# Patient Record
Sex: MALE | URBAN - METROPOLITAN AREA
[De-identification: names, ages, dates, MRNs, and addresses within clinical notes are randomized per-mention and may not be internally consistent; named-entity substitution may affect disease eponyms.]

---

## 2021-04-08 ENCOUNTER — EMERGENCY (EMERGENCY)
Facility: HOSPITAL | Age: 61
LOS: 1 days | End: 2021-04-08
Attending: EMERGENCY MEDICINE
Payer: COMMERCIAL

## 2021-04-08 DIAGNOSIS — Z95.1 PRESENCE OF AORTOCORONARY BYPASS GRAFT: Chronic | ICD-10-CM

## 2021-04-08 LAB
FLUAV AG NPH QL: SIGNIFICANT CHANGE UP COUNTS
FLUBV AG NPH QL: SIGNIFICANT CHANGE UP COUNTS
RSV RNA NPH QL NAA+NON-PROBE: SIGNIFICANT CHANGE UP COUNTS
SARS-COV-2 RNA SPEC QL NAA+PROBE: SIGNIFICANT CHANGE UP COUNTS

## 2021-04-08 PROCEDURE — 31500 INSERT EMERGENCY AIRWAY: CPT

## 2021-04-08 PROCEDURE — 99285 EMERGENCY DEPT VISIT HI MDM: CPT | Mod: 25

## 2021-04-08 PROCEDURE — 92950 HEART/LUNG RESUSCITATION CPR: CPT

## 2021-04-08 PROCEDURE — 99291 CRITICAL CARE FIRST HOUR: CPT | Mod: 25

## 2021-04-08 PROCEDURE — 87637 SARSCOV2&INF A&B&RSV AMP PRB: CPT

## 2021-04-08 PROCEDURE — 82962 GLUCOSE BLOOD TEST: CPT

## 2021-04-08 NOTE — ED PROVIDER NOTE - PHYSICAL EXAMINATION
Constitutional: Patient unresponsive, no purposeful movements. CPR in progress.  HENT: NC/AT. BVM/intubated.  Eyes: Pupils fixed and dilated.  Neck: Trachea midline.  Cardiac: No cardiac heart tones. No palpable pulse. CPR in progress. +Midline sternotomy scar with mild oozing from superior aspect.  Resp: BVM respirations/intubated with ETT. No spontaneous respirations.  Abd: soft, non-distended.  Extremities: Cool and mottled. +IO L humeral head.  Skin: Pale, cool to touch.  Neuro: Patient unresponsive, no spontaneous movements. Does not withdraw to pain.

## 2021-04-08 NOTE — ED PROVIDER NOTE - ATTENDING CONTRIBUTION TO CARE
I personally saw the patient with the resident, and completed the key components of the history and physical exam. I then discussed the management plan with the resident.    61 y/o M with CABG x 3, DM, HTN, HLD presented with cardiac arrest, asystole for EMS, intubated in field epi x 5. ETT confirmed, see MAR for code medications, continued asystole, no cardiac motion on bedside echo, no pulses, no corneal reflex, no gag reflex. Time of death called. Family notified. ME notified - no autopsy advised by ME.

## 2021-04-08 NOTE — ED PROVIDER NOTE - OBJECTIVE STATEMENT
60y M w/ hx CAD s/p CABGx3, DM, HTN, HLD, presenting for cardiac arrest.  Per EMS, pt had witnessed arrest at home at ~1100.  No shocks advised by AED.  Upon ALS arrival, pt in asystole.  Pt given epi x 5, intubated in the field.

## 2021-04-08 NOTE — ED PROVIDER NOTE - CLINICAL SUMMARY MEDICAL DECISION MAKING FREE TEXT BOX
60y M w/ CAD s/p CABG, HTN, HLD, DM, presenting after witnessed cardiac arrest.  Pt in asystole on arrival, s/p epi x 5 and intubation in the field.  Given additional epi x 3, bicarb x 1 and calcium x 1 in the ED but remained asystolic, pulseless, with no cardiac activity seen on POCUS.  Time of death 1154.  Will notify ME and family.

## 2021-04-08 NOTE — ED PROVIDER NOTE - PROGRESS NOTE DETAILS
Spoke to pt's family at bedside.  They report that he recently underwent cardiac bypass surgery on 3/9 at [facility].  Pt had been having some exertional dyspnea since the surgery.  Spoke with medical examiner office, who decline the case.

## 2021-04-08 NOTE — ED PROVIDER NOTE - PMH
CAD (coronary artery disease)    DM (diabetes mellitus)    HLD (hyperlipidemia)    HTN (hypertension)
